# Patient Record
Sex: FEMALE | Race: WHITE | URBAN - METROPOLITAN AREA
[De-identification: names, ages, dates, MRNs, and addresses within clinical notes are randomized per-mention and may not be internally consistent; named-entity substitution may affect disease eponyms.]

---

## 2018-01-24 NOTE — PATIENT DISCUSSION
Avastin #8 injection recommended today after discussion of benefits, risks, alternatives, and off-label use. The patient elects to proceed. The injection was administered without complication. Post-injection instructions were reviewed and understood by the patient.

## 2018-01-24 NOTE — PROCEDURE NOTE: CLINICAL
PROCEDURE NOTE: Avastin () #8 OD. Diagnosis: Neovascular AMD with Active CNV. Prep: Betadine Drops and Betadine Scrub. Prior to the original injection, risks/benefits/alternatives discussed including infection, loss of vision, hemorrhage, cataract, glaucoma, retinal tears or detachment. A written consent is on file, and the need for today’s injection was discussed and the patient is understanding and wishes to proceed. The off-label status of Intravitreal Avastin also was reviewed. The patient wished to proceed with treatment. The patient wished to proceed with treatment. Topical anesthetic drops were applied to the eye. Betadine prep was performed. Surgical mask worn. Sterile drape and lid speculum were applied. Using the syringe provided, Avastin 1.25 mg in 0.05 cc was injected into the vitreous cavity. Injection site: 3-4 mm from the limbus. Patient tolerated procedure well. Following the intravitreal injection, the sterile lid speculum was removed. CRA perfusion confirmed. CF vision checked. Patient given office phone number/answering service number and advised to call immediately should there be an increase in floaters or redness, loss of vision or pain, or should they have any other questions or concerns. Paddy Anaya

## 2018-04-25 NOTE — PROCEDURE NOTE: CLINICAL
PROCEDURE NOTE: Eylea #1 OD. Diagnosis: Neovascular AMD with Active CNV. Prep: Betadine Drops and Betadine Scrub. Prior to injection, risks/benefits/alternatives discussed including corneal abrasion, infection, loss of vision, hemorrhage, cataract, glaucoma, retinal tears or detachment. A written consent is on file, and the need for today’s injection was discussed and the patient is understanding and wishes to proceed. The entire vial of Eylea was drawn up into a syringe. The opened vial, remaining drug, and filtered needle were disposed of in a certified biohazard container. Betadine prep was performed. Topical anesthesia was induced with Alcaine. 4% lidocaine pledge. A lid speculum was used. A short 30g needle on a 1cc syringe was used with product that that had previously been prepared under sterile conditions. Injection site: 3-4 mm from the limbus. The used syringe/needle was transferred to a biohazard container. Lid speculum removed. Mask worn during procedure. Patient tolerated procedure well. Count fingers vision was verified. There were no complications. Patient was given the standard instruction sheet. Patient given office phone number/answering service number and advised to call immediately should there be loss of vision or pain, or should they have any other questions or concerns. Chauncey Anna

## 2018-04-25 NOTE — PATIENT DISCUSSION
Patient received last inj of Avastin on 01/24/18.  VERITO recommended today OD. Continue q 3 month treatment intervals.

## 2018-04-25 NOTE — PATIENT DISCUSSION
Eylea #1 injection recommended today after discussion of benefits, risks, alternatives, and off-label use. The patient elects to proceed. The injection was administered without complication. Post-injection instructions were reviewed and understood by the patient.

## 2018-07-25 NOTE — PROCEDURE NOTE: CLINICAL
PROCEDURE NOTE: Eylea #2 OD. Diagnosis: Neovascular AMD with Active CNV. Prior to injection, risks/benefits/alternatives discussed including corneal abrasion, infection, loss of vision, hemorrhage, cataract, glaucoma, retinal tears or detachment. A written consent is on file, and the need for today’s injection was discussed and the patient is understanding and wishes to proceed. The entire vial of Eylea was drawn up into a syringe. The opened vial, remaining drug, and filtered needle were disposed of in a certified biohazard container. Betadine prep was performed. Topical anesthesia was induced with Alcaine. 4% lidocaine pledge. A lid speculum was used. A short 30g needle on a 1cc syringe was used with product that that had previously been prepared under sterile conditions. Injection site: 3-4 mm from the limbus. The used syringe/needle was transferred to a biohazard container. Lid speculum removed. Mask worn during procedure. Patient tolerated procedure well. Count fingers vision was verified. There were no complications. Patient was given the standard instruction sheet. Patient given office phone number/answering service number and advised to call immediately should there be loss of vision or pain, or should they have any other questions or concerns. Tutu George

## 2018-07-25 NOTE — PATIENT DISCUSSION
No new subretinal/intraretinal fluid seen on today's examination and diagnostic testing. Recommended repeat injection today and following up in 3 months for possible Eylea injection.

## 2018-07-25 NOTE — PATIENT DISCUSSION
Eylea #2 injection recommended today after discussion of benefits, risks, alternatives. The patient elects to proceed. The injection was administered and tolerated well by the patient without complication. Post-injection instructions were reviewed and understood by the patient.

## 2018-07-25 NOTE — PATIENT DISCUSSION
07/25/2018 (RETINA)- OCT only at the next visit given the patient's age. POSSIBLE Eylea injection at that time. Recommended continuing injection intervals at 3 months given monocular status.

## 2018-10-17 NOTE — PATIENT DISCUSSION
10/17/2018 (RETINA)- OCT only at the next visit given the patient's age.  Will treat on a PRN basis.

## 2018-10-17 NOTE — PATIENT DISCUSSION
No new subretinal/intraretinal fluid seen on today's examination and diagnostic testing.  No treatment today.  Follow up in 6-7 weeks, will treat on a PRN basis.

## 2018-11-28 NOTE — PATIENT DISCUSSION
11/28/2018 (RETINA)- OCT only at the next visit given the patient's age.  Will treat on a PRN basis. q4mo.

## 2018-11-28 NOTE — PATIENT DISCUSSION
There is no recurrence of intraretinal or subretinal fluid on spectral domain OCT today. Increased Atrophy seen on examination, will hold on an injection and see the patient back in q4mo.

## 2019-03-22 NOTE — PATIENT DISCUSSION
End stage, No reactivation to WET AMD seen on examination. We will see the patient back in 6 months.

## 2019-04-06 ENCOUNTER — IMPORTED ENCOUNTER (OUTPATIENT)
Dept: URBAN - METROPOLITAN AREA CLINIC 1 | Facility: CLINIC | Age: 59
End: 2019-04-06

## 2019-04-06 PROBLEM — H52.4: Noted: 2019-04-06

## 2019-04-06 PROBLEM — H52.03: Noted: 2019-04-06

## 2019-04-06 PROCEDURE — S0621 ROUTINE OPHTHALMOLOGICAL EXA: HCPCS

## 2019-04-06 NOTE — PATIENT DISCUSSION
1.  Hyperopia OU -- Finalized Glasses MRx was given to patient today for corrective spectacles if indicated2. Presbyopia OU3. Cataracts OU -- Observe Return for an appointment in 1 YR for a 36 OU with Dr. Margareth Hinds.

## 2022-04-02 ASSESSMENT — VISUAL ACUITY
OS_CC: 20/20
OD_CC: 20/40

## 2022-04-02 ASSESSMENT — TONOMETRY
OS_IOP_MMHG: 16
OD_IOP_MMHG: 16

## 2023-07-24 ENCOUNTER — COMPREHENSIVE EXAM (OUTPATIENT)
Dept: URBAN - METROPOLITAN AREA CLINIC 1 | Facility: CLINIC | Age: 63
End: 2023-07-24

## 2023-07-24 DIAGNOSIS — H25.813: ICD-10-CM

## 2023-07-24 DIAGNOSIS — H43.393: ICD-10-CM

## 2023-07-24 PROCEDURE — 92014 COMPRE OPH EXAM EST PT 1/>: CPT

## 2023-07-24 ASSESSMENT — VISUAL ACUITY
OS_SC: 20/20-1
OD_SC: 20/20
OD_CC: J1+
OS_CC: J1+

## 2023-07-24 ASSESSMENT — TONOMETRY
OS_IOP_MMHG: 16
OD_IOP_MMHG: 15

## 2023-09-05 ENCOUNTER — OFFICE VISIT (OUTPATIENT)
Age: 63
End: 2023-09-05

## 2023-09-05 VITALS — WEIGHT: 202 LBS | BODY MASS INDEX: 33.66 KG/M2 | HEIGHT: 65 IN

## 2023-09-05 DIAGNOSIS — M70.62 TROCHANTERIC BURSITIS OF LEFT HIP: Primary | ICD-10-CM

## 2023-09-05 DIAGNOSIS — E66.01 MORBID OBESITY (HCC): ICD-10-CM

## 2023-09-05 RX ORDER — FUROSEMIDE 20 MG/1
TABLET ORAL
COMMUNITY
Start: 2023-07-24

## 2023-09-05 RX ORDER — ZOLPIDEM TARTRATE 5 MG/1
TABLET ORAL
COMMUNITY
Start: 2023-09-02

## 2023-09-05 RX ORDER — TRIAMCINOLONE ACETONIDE 40 MG/ML
80 INJECTION, SUSPENSION INTRA-ARTICULAR; INTRAMUSCULAR ONCE
Status: COMPLETED | OUTPATIENT
Start: 2023-09-05 | End: 2023-09-05

## 2023-09-05 RX ORDER — ATORVASTATIN CALCIUM 10 MG/1
TABLET, FILM COATED ORAL
COMMUNITY
Start: 2023-07-23

## 2023-09-05 RX ORDER — LISINOPRIL AND HYDROCHLOROTHIAZIDE 25; 20 MG/1; MG/1
TABLET ORAL
COMMUNITY
Start: 2023-07-24

## 2023-09-05 RX ADMIN — TRIAMCINOLONE ACETONIDE 80 MG: 40 INJECTION, SUSPENSION INTRA-ARTICULAR; INTRAMUSCULAR at 15:01

## 2023-09-05 NOTE — ASSESSMENT & PLAN NOTE
59-year-old female with left hip greater trochanteric bursitis. This been bothering her since about February 2023 and will wake her up from sleep when she lays on the left side. Has been achy and makes walking difficult. She has never had physical therapy before nor any injections. I offered her a cortisone injection today into the greater trochanteric bursa and she like to go forward with that. I also will get her started on physical therapy I would like her to do a couple of sessions and then do home exercise program.  I stressed the importance of the stretching and that the cortisone injection helps to relieve pain so that she can fully engage in the stretching and strengthening program.  I can see her back on an as-needed basis. I also provided her with a home exercise pamphlet that she can start until she gets to physical therapy.